# Patient Record
Sex: FEMALE | URBAN - METROPOLITAN AREA
[De-identification: names, ages, dates, MRNs, and addresses within clinical notes are randomized per-mention and may not be internally consistent; named-entity substitution may affect disease eponyms.]

---

## 2017-12-11 ENCOUNTER — HISTORICAL (OUTPATIENT)
Dept: ADMINISTRATIVE | Facility: HOSPITAL | Age: 72
End: 2017-12-11

## 2017-12-12 LAB — GRAM STN SPEC: NORMAL

## 2017-12-17 LAB — FINAL CULTURE: NORMAL

## 2022-04-30 NOTE — OP NOTE
DATE OF SURGERY:    12/11/2017    SURGEON:  Truong Coyle MD    PREOPERATIVE DIAGNOSIS:  Chronic endophthalmitis of the right eye.    POSTOPERATIVE DIAGNOSIS:  Chronic endophthalmitis of the right eye.    PROCEDURE:  Pars plana vitrectomy with vitreous sampling and injection of antibiotics, endolaser fluid air exchange and air gas exchange with 12% C3F8 gas all to the right eye.    ANESTHESIA:  General.    ESTIMATED BLOOD LOSS:  Less than 5 ml.    COMPLICATIONS:  None.    INDICATION FOR PROCEDURE:  The patient has a history of chronic mild iritis and vitreitis since cataract surgery and has a presumed case of endophthalmitis.  She requires a vitrectomy with vitreous sampling and injection of antibiotics.    PROCEDURE DESCRIPTION:  The patient was taken to the operative theater where general anesthesia was begun.  The right eye was prepped and draped in normal sterile fashion and a lid speculum was applied.  A standard three port 25 gauge pars plana vitrectomy was performed with all trocars being placed 3.5 mm from the surgical limbus.  The first 0.3 ml of vitreous sample was withdrawn in a pure state and sent for Gram stain and culture.  The vitrectomy was then continued.  The patient did not have a posterior vitreous detachment.  Inferotemporally, she did have a preretinal yellow mass and in the temporal retinal periphery she had white vascular ischemia versus periphlebitis.  A posterior vitreous detachment was created with vitrectomy cutter and the posterior hyaloid was removed out to the peripheral retina.  The yellow mass stayed on the retinal surface.  BSS was used to irrigate the mass thinking this was preretinal blood or preretinal white blood cells and it diffused away indicating it was 1 of the 2.  She also had a temporal retinal hole adjacent to, but not attached to the preretinal lesion.  Endolaser was used to treat around the retinal hole, as well as the extent of the retina in the area of the  vasculitis versus ischemia.  No other retinal breaks were identified.  A fluid air exchange was then performed.  Intravitreal injections of 1.0 mg in 0.1 ml of vancomycin and 2.25 mg in 0.1 ml of ceftazidime were both given followed by air gas exchange with 12% C3F8 gas.  All instruments were removed from the eye and all sclerotomies massaged closed with cotton-tip swabs.  Several drops of TobraDex ophthalmic solution were applied to the eye.  The lid speculum and eye drape were then removed and the eye was covered with gauze patch and Vann shield.  The patient was then transported to the postoperative care unit to recover.  Discharge condition is good.      Disposition is home with follow-up with Dr. Coyle the following day.  This patient tolerated the procedure well.        ______________________________  MD CHELSEA Solis/VICK  DD:  12/11/2017  Time:  05:36PM  DT:  12/12/2017  Time:  11:04AM  Job #:  [id number]